# Patient Record
Sex: FEMALE | Race: WHITE | NOT HISPANIC OR LATINO | Employment: STUDENT | URBAN - METROPOLITAN AREA
[De-identification: names, ages, dates, MRNs, and addresses within clinical notes are randomized per-mention and may not be internally consistent; named-entity substitution may affect disease eponyms.]

---
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
## 2023-01-20 ENCOUNTER — EMPLOYEE HEALTH (OUTPATIENT)
Dept: OCCUPATIONAL MEDICINE | Facility: CLINIC | Age: 23
End: 2023-01-20

## 2023-01-20 ENCOUNTER — EH NON-PROVIDER (OUTPATIENT)
Dept: OCCUPATIONAL MEDICINE | Facility: CLINIC | Age: 23
End: 2023-01-20

## 2023-01-20 ENCOUNTER — HOSPITAL ENCOUNTER (OUTPATIENT)
Facility: MEDICAL CENTER | Age: 23
End: 2023-01-20
Attending: NURSE PRACTITIONER
Payer: COMMERCIAL

## 2023-01-20 DIAGNOSIS — Z02.89 ENCOUNTER FOR OCCUPATIONAL HEALTH ASSESSMENT: ICD-10-CM

## 2023-01-20 DIAGNOSIS — Z02.1 PRE-EMPLOYMENT DRUG SCREENING: ICD-10-CM

## 2023-01-20 DIAGNOSIS — Z02.1 PRE-EMPLOYMENT HEALTH SCREENING EXAMINATION: ICD-10-CM

## 2023-01-20 LAB
AMP AMPHETAMINE: NORMAL
BAR BARBITURATES: NORMAL
BZO BENZODIAZEPINES: NORMAL
COC COCAINE: NORMAL
INT CON NEG: NORMAL
INT CON POS: NORMAL
MDMA ECSTASY: NORMAL
MET METHAMPHETAMINES: NORMAL
MTD METHADONE: NORMAL
OPI OPIATES: NORMAL
OXY OXYCODONE: NORMAL
PCP PHENCYCLIDINE: NORMAL
POC URINE DRUG SCREEN OCDRS: NEGATIVE
THC: NORMAL

## 2023-01-20 PROCEDURE — 80305 DRUG TEST PRSMV DIR OPT OBS: CPT | Performed by: NURSE PRACTITIONER

## 2023-01-20 PROCEDURE — 8915 PR COMPREHENSIVE PHYSICAL: Performed by: NURSE PRACTITIONER

## 2023-01-20 PROCEDURE — 86480 TB TEST CELL IMMUN MEASURE: CPT | Performed by: NURSE PRACTITIONER

## 2023-01-20 PROCEDURE — 94375 RESPIRATORY FLOW VOLUME LOOP: CPT | Performed by: NURSE PRACTITIONER

## 2023-01-25 LAB — TEST NAME 95000: NORMAL

## 2023-02-01 ENCOUNTER — EH NON-PROVIDER (OUTPATIENT)
Dept: OCCUPATIONAL MEDICINE | Facility: CLINIC | Age: 23
End: 2023-02-01

## 2023-10-02 ENCOUNTER — IMMUNIZATION (OUTPATIENT)
Dept: OCCUPATIONAL MEDICINE | Facility: CLINIC | Age: 23
End: 2023-10-02

## 2023-10-02 DIAGNOSIS — Z23 NEED FOR VACCINATION: Primary | ICD-10-CM

## 2023-10-02 PROCEDURE — 90686 IIV4 VACC NO PRSV 0.5 ML IM: CPT | Performed by: PREVENTIVE MEDICINE

## 2024-03-21 ENCOUNTER — OFFICE VISIT (OUTPATIENT)
Dept: URGENT CARE | Facility: PHYSICIAN GROUP | Age: 24
End: 2024-03-21
Payer: COMMERCIAL

## 2024-03-21 ENCOUNTER — HOSPITAL ENCOUNTER (OUTPATIENT)
Dept: RADIOLOGY | Facility: MEDICAL CENTER | Age: 24
End: 2024-03-21
Attending: PHYSICIAN ASSISTANT
Payer: COMMERCIAL

## 2024-03-21 VITALS
HEIGHT: 64 IN | DIASTOLIC BLOOD PRESSURE: 78 MMHG | TEMPERATURE: 97.3 F | RESPIRATION RATE: 20 BRPM | HEART RATE: 77 BPM | BODY MASS INDEX: 23.49 KG/M2 | WEIGHT: 137.57 LBS | OXYGEN SATURATION: 100 % | SYSTOLIC BLOOD PRESSURE: 104 MMHG

## 2024-03-21 DIAGNOSIS — R10.31 ABDOMINAL PAIN, RLQ: ICD-10-CM

## 2024-03-21 DIAGNOSIS — N20.0 RENAL CALCULI: ICD-10-CM

## 2024-03-21 PROCEDURE — 700117 HCHG RX CONTRAST REV CODE 255: Performed by: PHYSICIAN ASSISTANT

## 2024-03-21 PROCEDURE — 74177 CT ABD & PELVIS W/CONTRAST: CPT

## 2024-03-21 PROCEDURE — 99204 OFFICE O/P NEW MOD 45 MIN: CPT | Mod: 25 | Performed by: PHYSICIAN ASSISTANT

## 2024-03-21 PROCEDURE — 3074F SYST BP LT 130 MM HG: CPT | Performed by: PHYSICIAN ASSISTANT

## 2024-03-21 PROCEDURE — 3078F DIAST BP <80 MM HG: CPT | Performed by: PHYSICIAN ASSISTANT

## 2024-03-21 RX ORDER — KETOROLAC TROMETHAMINE 30 MG/ML
15 INJECTION, SOLUTION INTRAMUSCULAR; INTRAVENOUS ONCE
Status: COMPLETED | OUTPATIENT
Start: 2024-03-21 | End: 2024-03-21

## 2024-03-21 RX ORDER — ONDANSETRON 4 MG/1
4 TABLET, ORALLY DISINTEGRATING ORAL ONCE
Status: COMPLETED | OUTPATIENT
Start: 2024-03-21 | End: 2024-03-21

## 2024-03-21 RX ORDER — SERTRALINE HYDROCHLORIDE 25 MG/1
25 TABLET, FILM COATED ORAL
COMMUNITY
Start: 2024-03-11

## 2024-03-21 RX ORDER — ONDANSETRON 4 MG/1
4 TABLET, ORALLY DISINTEGRATING ORAL EVERY 6 HOURS PRN
Qty: 15 TABLET | Refills: 0 | Status: SHIPPED | OUTPATIENT
Start: 2024-03-21 | End: 2024-03-29

## 2024-03-21 RX ADMIN — IOHEXOL 100 ML: 350 INJECTION, SOLUTION INTRAVENOUS at 18:31

## 2024-03-21 RX ADMIN — ONDANSETRON 4 MG: 4 TABLET, ORALLY DISINTEGRATING ORAL at 19:29

## 2024-03-21 RX ADMIN — KETOROLAC TROMETHAMINE 15 MG: 30 INJECTION, SOLUTION INTRAMUSCULAR; INTRAVENOUS at 19:27

## 2024-03-21 RX ADMIN — ONDANSETRON 4 MG: 4 TABLET, ORALLY DISINTEGRATING ORAL at 17:33

## 2024-03-21 ASSESSMENT — ENCOUNTER SYMPTOMS
FLANK PAIN: 0
RESPIRATORY NEGATIVE: 1
VOMITING: 1
ABDOMINAL PAIN: 1
CONSTITUTIONAL NEGATIVE: 1
CARDIOVASCULAR NEGATIVE: 1
DIARRHEA: 0
NAUSEA: 1
CONSTIPATION: 0

## 2024-03-22 NOTE — PROGRESS NOTES
"  Subjective:     Kassi Mcmahon  is a 24 y.o. female who presents for RLQ Pain (Radiates to lower back ,vomiting x 1 day has had some congestion and sinus ,not well ,pale . )       She presents today, accompanied by her mother, for right lower quadrant pain that began earlier today.  States that the pain began abruptly and is present over the right lower quadrant, right bleak region and radiates towards the right back.  She has associated vomiting, loss of appetite as well as generalized feeling of being unwell.  She denies any changes to bowel habits.  No fevers.  No chest pain or shortness of breath.  No current back pain.  No urinary tract symptoms.  No vaginal pain bleeding or discharge.  Over the past few days she has been experiencing sinus congestion.  She does state that on the drive to the urgent care when the car would go over bumps in the road she would experience worsening abdominal pain.  Notes last menstrual cycle 3-4 weeks ago, patient denies any possibility being pregnant at this time     Review of Systems   Constitutional: Negative.    HENT:  Positive for congestion.    Respiratory: Negative.     Cardiovascular: Negative.    Gastrointestinal:  Positive for abdominal pain, nausea and vomiting. Negative for constipation and diarrhea.   Genitourinary:  Negative for dysuria, flank pain, frequency, hematuria and urgency.      No Known Allergies  History reviewed. No pertinent past medical history.     Objective:   /78   Pulse 77   Temp 36.3 °C (97.3 °F) (Temporal)   Resp 20   Ht 1.626 m (5' 4\")   Wt 62.4 kg (137 lb 9.1 oz)   SpO2 100%   BMI 23.61 kg/m²   Physical Exam  Vitals and nursing note reviewed.   Constitutional:       General: She is not in acute distress.     Appearance: She is ill-appearing (Patient is pale in the face with a sickly appearance). She is not toxic-appearing.   HENT:      Head: Normocephalic.   Eyes:      General: No scleral icterus.     Conjunctiva/sclera: " Conjunctivae normal.   Cardiovascular:      Rate and Rhythm: Normal rate and regular rhythm.   Pulmonary:      Effort: Pulmonary effort is normal. No respiratory distress.      Breath sounds: Normal breath sounds. No stridor.   Abdominal:      General: Abdomen is flat. Bowel sounds are normal. There is no distension.      Palpations: Abdomen is soft.      Tenderness: There is abdominal tenderness in the right lower quadrant. There is no right CVA tenderness, left CVA tenderness or guarding. Positive signs include McBurney's sign and psoas sign. Negative signs include Rovsing's sign.      Comments: Worsening pain with percussion on the bottom of the patient's right foot   Musculoskeletal:      Cervical back: Neck supple.   Neurological:      Mental Status: She is alert and oriented to person, place, and time.   Psychiatric:         Mood and Affect: Mood normal.         Behavior: Behavior normal.         Thought Content: Thought content normal.         Judgment: Judgment normal.           Diagnostic testing:    Abdominal/pelvic CT scan with contrast  Radiologist IMPRESSION:        1. Mild apparent wall thickening of the entire colon could relate to underdistention or mild colitis     2. Nonobstructive right renal calculi. No hydronephrosis.      Assessment/Plan:     Encounter Diagnoses   Name Primary?    Abdominal pain, RLQ     Renal calculi           Plan for care for today's complaint includes upon initial examination of the patient she was quite sickly appearing and pale in the face.  She did appear quite uncomfortable due to the severity of abdominal pain.  We did obtain an abdominal/pelvic CT scan today for evaluation of her right lower quadrant abdominal symptoms as we did have concern for possible appendicitis or kidney stone; there was a nonobstructive right-sided renal calculi but no hydronephrosis on exam today.  CT also showed colitis.  No acute appendicitis.  We did provide the patient 4 mg Zofran today  prior to her CT scan, did take roughly 30-45 minutes for CT scan to be performed and resulted by reexamination of the patient prior to discharge showed that she was appearing much improved.  She no longer had a pale or sickly appearance.  She did state that her symptoms were significantly improved as compared to when she first came to the urgent care.  We did discuss the CT findings with the patient and her mother, I did explain that due to the findings of the CT scan I do not have a definitive cause to her current symptoms.  We will provide a prescription of 4 mg Zofran to continue to be used as needed.  Did provide 15 mg Toradol injection prior to discharge.  Discussed strict ER precautions with the patient and her mother at this time, if symptoms return or become severe she needs to follow-up immediately in the emergency department for reevaluation.  I was wishing to obtain a urinalysis and urine pregnancy test today but throughout the exam patient was unable to provide a urine sample for us..  Prescription for Zofran provided.    See AVS Instructions below for written guidance provided to patient on after-visit management and care in addition to our verbal discussion during the visit.    Please note that this dictation was created using voice recognition software. I have attempted to correct all errors, but there may be sound-alike, spelling, grammar and possibly content errors that I did not discover before finalizing the note.    Damion Navarro PA-C

## 2024-03-29 ENCOUNTER — APPOINTMENT (OUTPATIENT)
Dept: RADIOLOGY | Facility: MEDICAL CENTER | Age: 24
End: 2024-03-29
Attending: STUDENT IN AN ORGANIZED HEALTH CARE EDUCATION/TRAINING PROGRAM
Payer: COMMERCIAL

## 2024-03-29 ENCOUNTER — HOSPITAL ENCOUNTER (EMERGENCY)
Facility: MEDICAL CENTER | Age: 24
End: 2024-03-29
Attending: STUDENT IN AN ORGANIZED HEALTH CARE EDUCATION/TRAINING PROGRAM
Payer: COMMERCIAL

## 2024-03-29 VITALS
SYSTOLIC BLOOD PRESSURE: 106 MMHG | WEIGHT: 142.64 LBS | OXYGEN SATURATION: 99 % | BODY MASS INDEX: 24.35 KG/M2 | RESPIRATION RATE: 14 BRPM | HEART RATE: 62 BPM | HEIGHT: 64 IN | DIASTOLIC BLOOD PRESSURE: 51 MMHG | TEMPERATURE: 98.4 F

## 2024-03-29 DIAGNOSIS — R11.2 NAUSEA AND VOMITING, UNSPECIFIED VOMITING TYPE: ICD-10-CM

## 2024-03-29 DIAGNOSIS — R10.31 ABDOMINAL PAIN, RIGHT LOWER QUADRANT: ICD-10-CM

## 2024-03-29 DIAGNOSIS — R10.9 FLANK PAIN: ICD-10-CM

## 2024-03-29 LAB
ALBUMIN SERPL BCP-MCNC: 4.7 G/DL (ref 3.2–4.9)
ALBUMIN/GLOB SERPL: 1.7 G/DL
ALP SERPL-CCNC: 59 U/L (ref 30–99)
ALT SERPL-CCNC: 9 U/L (ref 2–50)
ANION GAP SERPL CALC-SCNC: 14 MMOL/L (ref 7–16)
APPEARANCE UR: ABNORMAL
AST SERPL-CCNC: 14 U/L (ref 12–45)
BACTERIA #/AREA URNS HPF: NEGATIVE /HPF
BASOPHILS # BLD AUTO: 0.8 % (ref 0–1.8)
BASOPHILS # BLD: 0.09 K/UL (ref 0–0.12)
BILIRUB SERPL-MCNC: 0.5 MG/DL (ref 0.1–1.5)
BILIRUB UR QL STRIP.AUTO: NEGATIVE
BUN SERPL-MCNC: 8 MG/DL (ref 8–22)
CALCIUM ALBUM COR SERPL-MCNC: 8.5 MG/DL (ref 8.5–10.5)
CALCIUM SERPL-MCNC: 9.1 MG/DL (ref 8.5–10.5)
CHLORIDE SERPL-SCNC: 106 MMOL/L (ref 96–112)
CO2 SERPL-SCNC: 19 MMOL/L (ref 20–33)
COLOR UR: YELLOW
CREAT SERPL-MCNC: 0.83 MG/DL (ref 0.5–1.4)
EOSINOPHIL # BLD AUTO: 0.05 K/UL (ref 0–0.51)
EOSINOPHIL NFR BLD: 0.5 % (ref 0–6.9)
EPI CELLS #/AREA URNS HPF: NEGATIVE /HPF
ERYTHROCYTE [DISTWIDTH] IN BLOOD BY AUTOMATED COUNT: 40.5 FL (ref 35.9–50)
GFR SERPLBLD CREATININE-BSD FMLA CKD-EPI: 101 ML/MIN/1.73 M 2
GLOBULIN SER CALC-MCNC: 2.8 G/DL (ref 1.9–3.5)
GLUCOSE SERPL-MCNC: 103 MG/DL (ref 65–99)
GLUCOSE UR STRIP.AUTO-MCNC: NEGATIVE MG/DL
HCG SERPL QL: NEGATIVE
HCT VFR BLD AUTO: 45.3 % (ref 37–47)
HGB BLD-MCNC: 15.2 G/DL (ref 12–16)
HYALINE CASTS #/AREA URNS LPF: ABNORMAL /LPF
IMM GRANULOCYTES # BLD AUTO: 0.07 K/UL (ref 0–0.11)
IMM GRANULOCYTES NFR BLD AUTO: 0.6 % (ref 0–0.9)
KETONES UR STRIP.AUTO-MCNC: 40 MG/DL
LACTATE SERPL-SCNC: 1 MMOL/L (ref 0.5–2)
LEUKOCYTE ESTERASE UR QL STRIP.AUTO: ABNORMAL
LIPASE SERPL-CCNC: 18 U/L (ref 11–82)
LYMPHOCYTES # BLD AUTO: 2.2 K/UL (ref 1–4.8)
LYMPHOCYTES NFR BLD: 20.3 % (ref 22–41)
MCH RBC QN AUTO: 28.2 PG (ref 27–33)
MCHC RBC AUTO-ENTMCNC: 33.6 G/DL (ref 32.2–35.5)
MCV RBC AUTO: 84 FL (ref 81.4–97.8)
MICRO URNS: ABNORMAL
MONOCYTES # BLD AUTO: 0.47 K/UL (ref 0–0.85)
MONOCYTES NFR BLD AUTO: 4.3 % (ref 0–13.4)
NEUTROPHILS # BLD AUTO: 7.98 K/UL (ref 1.82–7.42)
NEUTROPHILS NFR BLD: 73.5 % (ref 44–72)
NITRITE UR QL STRIP.AUTO: NEGATIVE
NRBC # BLD AUTO: 0 K/UL
NRBC BLD-RTO: 0 /100 WBC (ref 0–0.2)
PH UR STRIP.AUTO: 5 [PH] (ref 5–8)
PLATELET # BLD AUTO: 393 K/UL (ref 164–446)
PMV BLD AUTO: 9.2 FL (ref 9–12.9)
POTASSIUM SERPL-SCNC: 3.8 MMOL/L (ref 3.6–5.5)
PROT SERPL-MCNC: 7.5 G/DL (ref 6–8.2)
PROT UR QL STRIP: 30 MG/DL
RBC # BLD AUTO: 5.39 M/UL (ref 4.2–5.4)
RBC # URNS HPF: ABNORMAL /HPF
RBC UR QL AUTO: ABNORMAL
SODIUM SERPL-SCNC: 139 MMOL/L (ref 135–145)
SP GR UR STRIP.AUTO: 1.03
UROBILINOGEN UR STRIP.AUTO-MCNC: 0.2 MG/DL
WBC # BLD AUTO: 10.9 K/UL (ref 4.8–10.8)
WBC #/AREA URNS HPF: ABNORMAL /HPF
YEAST BUDDING URNS QL: PRESENT /HPF

## 2024-03-29 PROCEDURE — 76830 TRANSVAGINAL US NON-OB: CPT

## 2024-03-29 PROCEDURE — 83690 ASSAY OF LIPASE: CPT

## 2024-03-29 PROCEDURE — 83605 ASSAY OF LACTIC ACID: CPT

## 2024-03-29 PROCEDURE — 96374 THER/PROPH/DIAG INJ IV PUSH: CPT

## 2024-03-29 PROCEDURE — 99284 EMERGENCY DEPT VISIT MOD MDM: CPT

## 2024-03-29 PROCEDURE — 81001 URINALYSIS AUTO W/SCOPE: CPT

## 2024-03-29 PROCEDURE — 700105 HCHG RX REV CODE 258: Performed by: EMERGENCY MEDICINE

## 2024-03-29 PROCEDURE — 85025 COMPLETE CBC W/AUTO DIFF WBC: CPT

## 2024-03-29 PROCEDURE — 84703 CHORIONIC GONADOTROPIN ASSAY: CPT

## 2024-03-29 PROCEDURE — 700111 HCHG RX REV CODE 636 W/ 250 OVERRIDE (IP): Mod: JZ | Performed by: STUDENT IN AN ORGANIZED HEALTH CARE EDUCATION/TRAINING PROGRAM

## 2024-03-29 PROCEDURE — 80053 COMPREHEN METABOLIC PANEL: CPT

## 2024-03-29 PROCEDURE — 96375 TX/PRO/DX INJ NEW DRUG ADDON: CPT

## 2024-03-29 PROCEDURE — 36415 COLL VENOUS BLD VENIPUNCTURE: CPT

## 2024-03-29 PROCEDURE — 700105 HCHG RX REV CODE 258: Performed by: STUDENT IN AN ORGANIZED HEALTH CARE EDUCATION/TRAINING PROGRAM

## 2024-03-29 RX ORDER — SODIUM CHLORIDE 9 MG/ML
1000 INJECTION, SOLUTION INTRAVENOUS ONCE
Status: COMPLETED | OUTPATIENT
Start: 2024-03-29 | End: 2024-03-29

## 2024-03-29 RX ORDER — ONDANSETRON 2 MG/ML
4 INJECTION INTRAMUSCULAR; INTRAVENOUS ONCE
Status: DISCONTINUED | OUTPATIENT
Start: 2024-03-29 | End: 2024-03-29

## 2024-03-29 RX ORDER — ONDANSETRON 4 MG/1
4 TABLET, ORALLY DISINTEGRATING ORAL EVERY 6 HOURS PRN
Qty: 10 TABLET | Refills: 0 | Status: SHIPPED | OUTPATIENT
Start: 2024-03-29

## 2024-03-29 RX ORDER — MORPHINE SULFATE 4 MG/ML
4 INJECTION INTRAVENOUS ONCE
Status: COMPLETED | OUTPATIENT
Start: 2024-03-29 | End: 2024-03-29

## 2024-03-29 RX ORDER — KETOROLAC TROMETHAMINE 15 MG/ML
15 INJECTION, SOLUTION INTRAMUSCULAR; INTRAVENOUS ONCE
Status: COMPLETED | OUTPATIENT
Start: 2024-03-29 | End: 2024-03-29

## 2024-03-29 RX ORDER — SODIUM CHLORIDE, SODIUM LACTATE, POTASSIUM CHLORIDE, CALCIUM CHLORIDE 600; 310; 30; 20 MG/100ML; MG/100ML; MG/100ML; MG/100ML
1000 INJECTION, SOLUTION INTRAVENOUS ONCE
Status: COMPLETED | OUTPATIENT
Start: 2024-03-29 | End: 2024-03-29

## 2024-03-29 RX ORDER — ONDANSETRON 2 MG/ML
4 INJECTION INTRAMUSCULAR; INTRAVENOUS ONCE
Status: COMPLETED | OUTPATIENT
Start: 2024-03-29 | End: 2024-03-29

## 2024-03-29 RX ORDER — METOCLOPRAMIDE HYDROCHLORIDE 5 MG/ML
10 INJECTION INTRAMUSCULAR; INTRAVENOUS ONCE
Status: COMPLETED | OUTPATIENT
Start: 2024-03-29 | End: 2024-03-29

## 2024-03-29 RX ADMIN — SODIUM CHLORIDE, POTASSIUM CHLORIDE, SODIUM LACTATE AND CALCIUM CHLORIDE 1000 ML: 600; 310; 30; 20 INJECTION, SOLUTION INTRAVENOUS at 17:51

## 2024-03-29 RX ADMIN — SODIUM CHLORIDE 1000 ML: 9 INJECTION, SOLUTION INTRAVENOUS at 20:00

## 2024-03-29 RX ADMIN — ONDANSETRON 4 MG: 2 INJECTION INTRAMUSCULAR; INTRAVENOUS at 19:25

## 2024-03-29 RX ADMIN — KETOROLAC TROMETHAMINE 15 MG: 15 INJECTION, SOLUTION INTRAMUSCULAR; INTRAVENOUS at 18:47

## 2024-03-29 RX ADMIN — METOCLOPRAMIDE 10 MG: 5 INJECTION, SOLUTION INTRAMUSCULAR; INTRAVENOUS at 20:00

## 2024-03-29 RX ADMIN — MORPHINE SULFATE 4 MG: 4 INJECTION, SOLUTION INTRAMUSCULAR; INTRAVENOUS at 19:26

## 2024-03-29 ASSESSMENT — PAIN DESCRIPTION - PAIN TYPE
TYPE: ACUTE PAIN

## 2024-03-29 NOTE — ED TRIAGE NOTES
Chief Complaint   Patient presents with    Flank Pain     Pt was diagnosed with a kidney stone on Thursday a week ago, pt reports pain went away so she thought it passed and then pain came back today.      Pt ambulatory to triage for above complaint. Pt appears uncomfortable in triage. Pt denies burning, painful or difficulty with urination.

## 2024-03-30 NOTE — ED PROVIDER NOTES
CHIEF COMPLAINT  Chief Complaint   Patient presents with    Flank Pain     Pt was diagnosed with a kidney stone on Thursday a week ago, pt reports pain went away so she thought it passed and then pain came back today.        LIMITATION TO HISTORY   Select:     DARCY Mcmahon is a 24 y.o. female who presents to the Emergency Department for evaluation of pain in her right lower flank she was seen at urgent care on Thursday for the symptoms had a CT scan was told she has a kidney stone she reports her pain is worsened she has had some nausea no vomiting denies fevers denies urinary symptoms    OUTSIDE HISTORIAN(S):  Select:    EXTERNAL RECORDS REVIEWED  Select:       PAST MEDICAL HISTORY  History reviewed. No pertinent past medical history.  .    SURGICAL HISTORY  History reviewed. No pertinent surgical history.      FAMILY HISTORY  History reviewed. No pertinent family history.       SOCIAL HISTORY  Social History     Socioeconomic History    Marital status: Single     Spouse name: Not on file    Number of children: Not on file    Years of education: Not on file    Highest education level: Not on file   Occupational History    Not on file   Tobacco Use    Smoking status: Never    Smokeless tobacco: Never   Vaping Use    Vaping Use: Never used   Substance and Sexual Activity    Alcohol use: Yes     Comment: occasionally    Drug use: Never    Sexual activity: Not on file   Other Topics Concern    Not on file   Social History Narrative    Not on file     Social Determinants of Health     Financial Resource Strain: Not on file   Food Insecurity: Not on file   Transportation Needs: Not on file   Physical Activity: Not on file   Stress: Not on file   Social Connections: Not on file   Intimate Partner Violence: Not on file   Housing Stability: Not on file         CURRENT MEDICATIONS  No current facility-administered medications on file prior to encounter.     Current Outpatient Medications on File Prior to  "Encounter   Medication Sig Dispense Refill    sertraline (ZOLOFT) 25 MG tablet Take 25 mg by mouth every day. FOR 30 DAYS             ALLERGIES  No Known Allergies    PHYSICAL EXAM  VITAL SIGNS:/51   Pulse 62   Temp 36.9 °C (98.4 °F) (Tympanic)   Resp 14   Ht 1.626 m (5' 4\")   Wt 64.7 kg (142 lb 10.2 oz)   LMP 02/20/2024   SpO2 99%   BMI 24.48 kg/m²       GENERAL: Awake and alert  HEAD: Normocephalic and atraumatic  NECK: Normal range of motion, without meningismus  EYES: Pupils Equal, Round, Reactive to Light, extraocular movements intact, conjunctiva white  ENT: Mucous membranes moist, oropharynx clear  PULMONARY: Normal effort, clear to auscultation  CARDIOVASCULAR: No murmurs, clicks or rubs, peripheral pulses 2+  ABDOMINAL: Soft, non-tender, no guarding or rigidity present, no pulsatile masses  BACK: no midline tenderness, no costovertebral tenderness, tenderness palpation of the right lower back lateral to L5  NEUROLOGICAL: Grossly non-focal neurological examination, speech normal, gait normal  EXTREMITIES: No edema, normal to inspection  SKIN: Warm and dry.  PSYCHIATRIC: Affect is appropriate        LABS  Labs Reviewed   CBC WITH DIFFERENTIAL - Abnormal; Notable for the following components:       Result Value    WBC 10.9 (*)     Neutrophils-Polys 73.50 (*)     Lymphocytes 20.30 (*)     Neutrophils (Absolute) 7.98 (*)     All other components within normal limits   COMP METABOLIC PANEL - Abnormal; Notable for the following components:    Co2 19 (*)     Glucose 103 (*)     All other components within normal limits   URINALYSIS,CULTURE IF INDICATED - Abnormal; Notable for the following components:    Character Cloudy (*)     Ketones 40 (*)     Protein 30 (*)     Leukocyte Esterase Trace (*)     Occult Blood Small (*)     All other components within normal limits   URINE MICROSCOPIC (W/UA) - Abnormal; Notable for the following components:    RBC 5-10 (*)     Hyaline Cast 3-5 (*)     Budding Yeast " Present (*)     All other components within normal limits   HCG QUAL SERUM   LIPASE   LACTIC ACID   ESTIMATED GFR         RADIOLOGY  I independently reviewed and interpreted the images obtained today in the ER.  No evidence of torsion    Radiologist interpretation:   US-PELVIC COMPLETE (TRANSABDOMINAL/TRANSVAGINAL) (COMBO)   Final Result         1.  Small free fluid collection the posterior cul-de-sac and left adnexa.   2.  Otherwise unremarkable pelvic sonogram           COURSE & MEDICAL DECISION MAKING    ED COURSE:        INTERVENTIONS BY ME:  Medications   lactated ringers (LR) bolus (0 mL Intravenous Stopped 3/29/24 1851)   ketorolac (Toradol) 15 MG/ML injection 15 mg (15 mg Intravenous Given 3/29/24 1847)   ondansetron (Zofran) syringe/vial injection 4 mg (4 mg Intravenous Given 3/29/24 1925)   morphine 4 MG/ML injection 4 mg (4 mg Intravenous Given 3/29/24 1926)   NS (Bolus) 0.9 % infusion 1,000 mL (0 mL Intravenous Stopped 3/29/24 2100)   metoclopramide (Reglan) injection 10 mg (10 mg Intravenous Given 3/29/24 2000)       Response on recheck:    Reevaluation: 7:20 PM informed by nurse that patient decided change in clinical status she is reassessed she is now comfortable appearing vomiting endorsing severe pain in her right lower quadrant and now more in her right flank area morphine and Zofran ordered in addition to transvaginal ultrasound.  7:35 PM re-evaluation: Patient has recieved paraenteral narcotics requiring continous monitoring with pulse oximeter, cardiac monitoring, in addition to serial re-assmsents to evaluate for respiratory depression  10:15 PM PM patient resting comfortably she reports her pain has abated considerably she still some discomfort but is not endorsing any significant pain no longer nauseous with vomiting no pain in the right lower quadrant  11:20 PM patient reports her symptoms have resolved she has no pain no nausea at this time discussed her workup so far with some fluid  around her right ovary possibly consistent with recently ruptured ovarian cyst mother does have a history of ovarian cyst discussed return precautions should her symptoms worsen or return    INITIAL ASSESSMENT, COURSE AND PLAN  Care Narrative:   Patient presenting with initially a reassuring examination in the [facility] with some mild pain in her right lower flank with a normal CT scan obtained on Thursday for the same symptoms CT scan notable for nephrolithiasis in the kidney.  The Emergency Department patient had an episode of significant pain and vomiting in her right lower quadrant she was uncomfortable appearing prompting transvaginal ultrasound to evaluate for ovarian torsion.  Ultrasound notable for small amount of free fluid unclear if this is physiologic, related to a recently ruptured cyst, she is nonperitoneal is well-appearing and her pain resolved so I do not suspect this free fluid is secondary to perforated viscus or surgical pathology, and given her symptoms resolved I feel she is safe to be discharged home.  Discussed utility of repeat CT scan at this time and this time we will forego this test discussed the chance of having a missed appendicitis and feel this is quite low.  Prior CT scan did show mild apparent thickening of the colon related to underdistention or mild colitis.  However patient generally has had no abdominal pain no diarrhea no fevers so feel this is less likely blood work notable for slight leukocytosis and very slight acidosis suspect this is secondary to nausea and vomiting           ADDITIONAL PROBLEM LIST    DISPOSITION AND DISCUSSIONS  Discussion of management with other [facility] or appropriate source(s): None       Decision tools and prescription drugs considered including, but not limited to: Prescribed ondansetron    FINAL DIAGNOSIS  1. Flank pain    2. Abdominal pain, right lower quadrant    3. Nausea and vomiting, unspecified vomiting type             Electronically signed by: Evangelista  Darryn FOUNTAIN ,11:58 PM 03/29/24

## 2024-03-30 NOTE — ED NOTES
[facility] Emergency Department  [address]  [facility] [address]  Phone:  [phone]                                    Eevlio Ahmadi   MRN: [id number]    Department:  [facility] Emergency Department   Date of Visit:  7/6/2019           After Visit Summary Signature Page    I have received my discharge instructions, and my questions have been answered. I have discussed any challenges I see with this plan with the nurse or doctor.    ..........................................................................................................................................  Patient/Patient Representative Signature      ..........................................................................................................................................  Patient Representative Print Name and Relationship to Patient    ..................................................               ................................................  Date                                   Time    ..........................................................................................................................................  Reviewed by Signature/Title    ...................................................              ..............................................  Date                                               Time          22EPIC Rev 08/18        Discharge instructions reviewed with patient and signed. IV was removed from arm. They verbalized understanding of follow up instructions. All belongings with patient. They ambulate with a steady gait

## 2024-09-24 ENCOUNTER — IMMUNIZATION (OUTPATIENT)
Dept: OCCUPATIONAL MEDICINE | Facility: CLINIC | Age: 24
End: 2024-09-24

## 2024-09-24 DIAGNOSIS — Z23 NEED FOR VACCINATION: Primary | ICD-10-CM

## 2025-01-08 ENCOUNTER — EH NON-PROVIDER (OUTPATIENT)
Dept: OCCUPATIONAL MEDICINE | Facility: CLINIC | Age: 25
End: 2025-01-08

## 2025-01-08 DIAGNOSIS — Z02.89 ENCOUNTER FOR OCCUPATIONAL HEALTH ASSESSMENT: ICD-10-CM

## 2025-01-08 PROCEDURE — 94375 RESPIRATORY FLOW VOLUME LOOP: CPT | Performed by: NURSE PRACTITIONER
